# Patient Record
Sex: FEMALE | Race: WHITE
[De-identification: names, ages, dates, MRNs, and addresses within clinical notes are randomized per-mention and may not be internally consistent; named-entity substitution may affect disease eponyms.]

---

## 2018-02-07 ENCOUNTER — HOSPITAL ENCOUNTER (EMERGENCY)
Dept: HOSPITAL 89 - ER | Age: 28
Discharge: HOME | End: 2018-02-07
Payer: SELF-PAY

## 2018-02-07 VITALS — HEIGHT: 68 IN | WEIGHT: 238 LBS | BODY MASS INDEX: 36.07 KG/M2

## 2018-02-07 VITALS — SYSTOLIC BLOOD PRESSURE: 119 MMHG | DIASTOLIC BLOOD PRESSURE: 78 MMHG

## 2018-02-07 DIAGNOSIS — S93.602A: Primary | ICD-10-CM

## 2018-02-07 PROCEDURE — 99284 EMERGENCY DEPT VISIT MOD MDM: CPT

## 2018-02-07 NOTE — ER REPORT
History and Physical


Time Seen By MD:  17:20


Hx. of Stated Complaint:  


HURT LEFT FOOT ABOUT 2 WEEKS. GOTTEN WORSE THE LAST COUPLE DAYS. SWELLED A 


COUPLE DAYS AGO AND HARD TO WALK ON


HPI/ROS


CHIEF COMPLAINT: Left foot pain





HISTORY OF PRESENT ILLNESS: Patient is a 27-year-old female who presents the ED 

with complaint of left foot pain for the past 10 days. She states that she 

stepped on a rock about 10 days ago and twisted her left foot. She has had pain 

ever since. She has noted some swelling as well. She describes pain more in the 

mid foot area. She has not noted any bruising. She has not taken any medication 

or applied ice for this.





REVIEW OF SYSTEMS:


Respiratory: No cough, no dyspnea.


Cardiovascular: No chest pain, no palpitations.


Gastrointestinal: No vomiting, no abdominal pain.


Musculoskeletal: See history of present illness.


Allergies:  


Coded Allergies:  


     No Known Drug Allergies (Verified , 2/7/18)


Home Meds


Discontinued Scripts


Albuterol Sulfate (VENTOLIN HFA) 18 Gm Inh, 1-2 PUFF INH 3-4XD for cough, #1 

INH 0 Refills


   Prov:KIRSTEN CUNNINGHAM MD         10/2/17


Prednisone (PREDNISONE) 20 Mg Tablet, 60 MG PO QDAY, #12 TAB 0 Refills


   first dose on 10/3/17


   Prov:KIRSTEN CUNNINGHAM MD         10/2/17


Azithromycin (ZITHROMAX) 250 Mg Tablet, 2 TAB PO ONCE for 3 Days, #6 TAB 0 

Refills


   Prov:KIRSTEN CUNNINGHAM MD         10/2/17


Reviewed Nurses Notes:  Yes


Old Medical Records Reviewed:  Yes


Hx Smoking:  Yes (PPD)


Smoking Status:  Current: Every Day Smoker


Hx Substance Use Disorder:  No


Hx Alcohol Use:  No


Constitutional





Vital Sign - Last 24 Hours








 2/7/18





 17:27


 


Temp 97.9


 


Pulse 62


 


Resp 12


 


B/P (MAP) 119/78


 


Pulse Ox 97


 


O2 Delivery Room Air








Physical Exam


   General Appearance: The patient is alert, has no immediate need for airway 

protection and no signs of toxicity. Patient appears to be in no acute distress.


Respiratory: There are no retractions, lungs are clear to auscultation.


Cardiovascular: Regular rate and rhythm. 


Skin: Warm and dry, no rashes.


Musculoskeletal:  Neck is supple non tender.


There is pain with palpation of the left midfoot area. PT and DP pulses are 2+ 

with normal capillary refill. Normal sensation. Full range of motion with 

slight pain.





Medical Decision Making


EKG/Imaging


Imaging


Left Foot Xrays:





IMPRESSION:  Negative left foot.


 


Report Dictated By: Colton Hoffman MD at 2/7/2018 6:03 PM


 


Report E-Signed By: Colton Hoffman MD  at 2/7/2018 6:07 PM





ED Course/Re-evaluation


ED Course


Will obtain left foot x-rays.





 02/07/2018 6:17:15 pm - discussed left foot x-rays with patient. It appears 

that she has no fracture. She likely has a sprain of her left foot.


Decision to Disposition Date:  Feb 7, 2018


Decision to Disposition Time:  18:17





Depart


Departure


Latest Vital Signs





Vital Signs








  Date Time  Temp Pulse Resp B/P (MAP) Pulse Ox O2 Delivery O2 Flow Rate FiO2


 


2/7/18 17:27 97.9 62 12 119/78 97 Room Air  








Impression:  


 Primary Impression:  


 Sprain of left foot


Condition:  Improved


Disposition:  HOME OR SELF-CARE


Patient Instructions:  Foot Sprain (ED)





Additional Instructions:  


Rest, ice, elevate. May take Tylenol or ibuprofen for pain relief. Follow-up 

with primary care provider in 2-3 days.





Problem Qualifiers








 Primary Impression:  


 Sprain of left foot


 Encounter type:  initial encounter  Qualified Codes:  S93.602A - Unspecified 

sprain of left foot, initial encounter








ARIELLE GARCIA PA-C Feb 7, 2018 17:46

## 2018-02-07 NOTE — RADIOLOGY IMAGING REPORT
FACILITY: [facility] [place] 

 

PATIENT NAME: Katy Echeverria

: 1990

MR: [id number]

V: [id number]

EXAM DATE: 

ORDERING PHYSICIAN: ARIELLE GARCIA

TECHNOLOGIST: 

 

Location: [facility] [place]

Patient: Katy Echeverria

: 1990

MRN: [id number]

Visit/Account:[id number]

Date of Sevice:  2018

 

ACCESSION #: [id number]

 

EXAMINATION: Left foot 3 views

 

HISTORY: Left mid foot pain. Twisted 10 days ago.

 

COMPARISON: None.

 

FINDINGS:

No evidence of acute fracture or dislocation about the left foot.  Normal alignment.  Joint spaces ar
e preserved.  Soft tissues are unremarkable.

 

IMPRESSION:  Negative left foot.

 

Report Dictated By: Colton Hoffman MD at 2018 6:03 PM

 

Report E-Signed By: Colton Hoffman MD  at 2018 6:07 PM

 

WSN:[id number]

## 2018-11-10 ENCOUNTER — HOSPITAL ENCOUNTER (EMERGENCY)
Dept: HOSPITAL 89 - ER | Age: 28
Discharge: HOME | End: 2018-11-10
Payer: SELF-PAY

## 2018-11-10 VITALS — SYSTOLIC BLOOD PRESSURE: 112 MMHG | DIASTOLIC BLOOD PRESSURE: 87 MMHG

## 2018-11-10 DIAGNOSIS — M25.571: Primary | ICD-10-CM

## 2018-11-10 LAB — PLATELET COUNT, AUTOMATED: 307 K/UL (ref 150–450)

## 2018-11-10 PROCEDURE — 84450 TRANSFERASE (AST) (SGOT): CPT

## 2018-11-10 PROCEDURE — 82374 ASSAY BLOOD CARBON DIOXIDE: CPT

## 2018-11-10 PROCEDURE — 84550 ASSAY OF BLOOD/URIC ACID: CPT

## 2018-11-10 PROCEDURE — 84460 ALANINE AMINO (ALT) (SGPT): CPT

## 2018-11-10 PROCEDURE — 84075 ASSAY ALKALINE PHOSPHATASE: CPT

## 2018-11-10 PROCEDURE — 85651 RBC SED RATE NONAUTOMATED: CPT

## 2018-11-10 PROCEDURE — 36415 COLL VENOUS BLD VENIPUNCTURE: CPT

## 2018-11-10 PROCEDURE — 82565 ASSAY OF CREATININE: CPT

## 2018-11-10 PROCEDURE — 84132 ASSAY OF SERUM POTASSIUM: CPT

## 2018-11-10 PROCEDURE — 85025 COMPLETE CBC W/AUTO DIFF WBC: CPT

## 2018-11-10 PROCEDURE — 84520 ASSAY OF UREA NITROGEN: CPT

## 2018-11-10 PROCEDURE — 82947 ASSAY GLUCOSE BLOOD QUANT: CPT

## 2018-11-10 PROCEDURE — 82310 ASSAY OF CALCIUM: CPT

## 2018-11-10 PROCEDURE — 84155 ASSAY OF PROTEIN SERUM: CPT

## 2018-11-10 PROCEDURE — 82435 ASSAY OF BLOOD CHLORIDE: CPT

## 2018-11-10 PROCEDURE — 82040 ASSAY OF SERUM ALBUMIN: CPT

## 2018-11-10 PROCEDURE — 84295 ASSAY OF SERUM SODIUM: CPT

## 2018-11-10 PROCEDURE — 86140 C-REACTIVE PROTEIN: CPT

## 2018-11-10 PROCEDURE — 82247 BILIRUBIN TOTAL: CPT

## 2018-11-10 PROCEDURE — 99283 EMERGENCY DEPT VISIT LOW MDM: CPT

## 2018-11-10 NOTE — ER REPORT
History and Physical


Time Seen By MD:  21:05


Hx. of Stated Complaint:  


patient states that she has right ankle pain that started when she woke up today





from sleeping; patient states pain is 10/10 while standing


HPI/ROS


CHIEF COMPLAINT: Ankle pain





HISTORY OF PRESENT ILLNESS: This is a 28-year-old female. She awoke this evening


to go to work at her night shift at the local [employer]. She woke a few hours 


ago and noticed that she was having some right ankle pain. She took some 


ibuprofen because she does have a history of diffuse arthritis pain. She does 


not remember injuring the ankle at all. Because she does get pain occasionally 


she took the ibuprofen and went back to sleep to see if it would go away which 


pain like this usually does. When she woke to get up to go to work, she has 


severe pain in the ankle whenever she puts weight on it. She denies any fevers 


or chills. Even light touch seems to hurt the ankle on the medial aspect over 


the medial malleolus area. No history of inflammatory conditions that she is 


aware of such as gout or other specific arthritis's. Denies any shortness of 


breath or chest pain. No swelling of the ankle or leg.


Allergies:  


Coded Allergies:  


     No Known Drug Allergies (Verified , 2/7/18)


Home Meds


Active Scripts


Ketorolac Tromethamine (KETOROLAC TROMETHAMINE) 10 Mg Tab, 10 MG PO Q6H PRN for 


PAIN, #12 TAB 0 Refills


   Prov:YOVANI TITUS MD         11/10/18


Hydrocodone Bit/Acetaminophen (HYDROCODON-ACETAMINOPHEN 5-325) 1 Each Tablet, 1 


EACH PO Q4H PRN for PAIN, #6 TAB 0 Refills


   Prov:YOVANI TITUS MD         11/10/18


Reviewed Nurses Notes:  Yes


Hx Smoking:  Yes (PPD)


Smoking Status:  Current: Every Day Smoker


Hx Substance Use Disorder:  No


Hx Alcohol Use:  No


Constitutional





Vital Sign - Last 24 Hours








 11/10/18 11/10/18 11/10/18 11/10/18





 20:53 20:56 20:56 21:00


 


Temp   98.7 


 


Pulse ???  97 


 


Resp   18 


 


B/P (MAP)  132/92 (105) 132/92 108/64 (79)


 


Pulse Ox   96 


 


O2 Delivery   Room Air 


 


    





 11/10/18 11/10/18 11/10/18 11/10/18





 21:23 21:53 22:23 22:53


 


Pulse 78 73 76 78


 


B/P (MAP)    112/87 (95)


 


Pulse Ox 94 95 95 95








Physical Exam


  General Appearance: Alert, no acute distress.


Respiratory: Breathing easily, no shortness of breath.


Cardiac: Normal pulses in the ankle and foot.


Neuro: Normal sensation throughout the foot and lower leg. Active range of 


motion intact, no weakness.


Musculoskeletal: No tenderness over the foot itself. No pain in the Achilles 


tendon or calcaneus. She does have pain over the medial malleolus with pressure 


as well as light pressure. No tenderness over the lateral malleolus. No pain up 


in the lower leg. 


Skin: No rashes or lesions. The skin is not warm or swollen.





DIFFERENTIAL DIAGNOSIS: After history and physical exam differential diagnosis 


was considered for pain in the right ankle, uncertain etiology.





Medical Decision Making


Data Points


Result Diagram:  


11/10/18 2124                                                                   


            11/10/18 2124





Laboratory





Hematology








Test


 11/10/18


21:24


 


Red Blood Count


 5.46 M/uL


(4.17-5.56)


 


Mean Corpuscular Volume


 87.2 fL


(80.0-96.0)


 


Mean Corpuscular Hemoglobin


 29.5 pg


(26.0-33.0)


 


Mean Corpuscular Hemoglobin


Concent 33.8 g/dL


(32.0-36.0)


 


Red Cell Distribution Width


 13.7 %


(11.5-14.5)


 


Mean Platelet Volume


 7.8 fL


(7.2-11.1)


 


Neutrophils (%) (Auto)


 63.8 %


(39.4-72.5)


 


Lymphocytes (%) (Auto)


 25.3 %


(17.6-49.6)


 


Monocytes (%) (Auto)


 6.6 %


(4.1-12.4)


 


Eosinophils (%) (Auto)


 3.5 %


(0.4-6.7)


 


Basophils (%) (Auto)


 0.8 %


(0.3-1.4)


 


Nucleated RBC Relative Count


(auto) 0.0 /100WBC 





 


Neutrophils # (Auto)


 7.0 K/uL


(2.0-7.4)


 


Lymphocytes # (Auto)


 2.8 K/uL


(1.3-3.6)


 


Monocytes # (Auto)


 0.7 K/uL


(0.3-1.0)


 


Eosinophils # (Auto)


 0.4 K/uL


(0.0-0.5)


 


Basophils # (Auto)


 0.1 K/uL


(0.0-0.1)


 


Nucleated RBC Absolute Count


(auto) 0.00 K/uL 





 


Erythrocyte Sedimentation Rate


 16 mm/HOUR


(0-20)


 


Sodium Level


 138 mmol/L


(137-145)


 


Potassium Level


 4.0 mmol/L


(3.5-5.0)


 


Chloride Level


 106 mmol/L


()


 


Carbon Dioxide Level


 22 mmol/L


(22-31)


 


Blood Urea Nitrogen


 20 mg/dl


(7-18)


 


Creatinine


 0.70 mg/dl


(0.52-1.04)


 


Glomerular Filtration Rate


Calc > 60.0 





 


Random Glucose


 101 mg/dl


()


 


Uric Acid


 5.4 mg/dl


(2.5-7.5)


 


Calcium Level


 9.0 mg/dl


(8.4-10.2)


 


Total Bilirubin


 0.6 mg/dl


(0.2-1.3)


 


Aspartate Amino Transf


(AST/SGOT) 26 U/L (0-35) 





 


Alanine Aminotransferase


(ALT/SGPT) 42 U/L (0-56) 





 


Alkaline Phosphatase 64 U/L (0-126) 


 


C-Reactive Protein


 0.6 mg/dl


(<1.0)


 


Total Protein


 7.3 g/dl


(6.3-8.2)


 


Albumin


 3.9 g/dl


(3.5-5.0)








Chemistry








Test


 11/10/18


21:24


 


White Blood Count


 10.9 k/uL


(4.5-11.0)


 


Red Blood Count


 5.46 M/uL


(4.17-5.56)


 


Hemoglobin


 16.1 g/dL


(12.0-16.0)


 


Hematocrit


 47.7 %


(34.0-47.0)


 


Mean Corpuscular Volume


 87.2 fL


(80.0-96.0)


 


Mean Corpuscular Hemoglobin


 29.5 pg


(26.0-33.0)


 


Mean Corpuscular Hemoglobin


Concent 33.8 g/dL


(32.0-36.0)


 


Red Cell Distribution Width


 13.7 %


(11.5-14.5)


 


Platelet Count


 307 K/uL


(150-450)


 


Mean Platelet Volume


 7.8 fL


(7.2-11.1)


 


Neutrophils (%) (Auto)


 63.8 %


(39.4-72.5)


 


Lymphocytes (%) (Auto)


 25.3 %


(17.6-49.6)


 


Monocytes (%) (Auto)


 6.6 %


(4.1-12.4)


 


Eosinophils (%) (Auto)


 3.5 %


(0.4-6.7)


 


Basophils (%) (Auto)


 0.8 %


(0.3-1.4)


 


Nucleated RBC Relative Count


(auto) 0.0 /100WBC 





 


Neutrophils # (Auto)


 7.0 K/uL


(2.0-7.4)


 


Lymphocytes # (Auto)


 2.8 K/uL


(1.3-3.6)


 


Monocytes # (Auto)


 0.7 K/uL


(0.3-1.0)


 


Eosinophils # (Auto)


 0.4 K/uL


(0.0-0.5)


 


Basophils # (Auto)


 0.1 K/uL


(0.0-0.1)


 


Nucleated RBC Absolute Count


(auto) 0.00 K/uL 





 


Erythrocyte Sedimentation Rate


 16 mm/HOUR


(0-20)


 


Glomerular Filtration Rate


Calc > 60.0 





 


Uric Acid


 5.4 mg/dl


(2.5-7.5)


 


Calcium Level


 9.0 mg/dl


(8.4-10.2)


 


Total Bilirubin


 0.6 mg/dl


(0.2-1.3)


 


Aspartate Amino Transf


(AST/SGOT) 26 U/L (0-35) 





 


Alanine Aminotransferase


(ALT/SGPT) 42 U/L (0-56) 





 


Alkaline Phosphatase 64 U/L (0-126) 


 


C-Reactive Protein


 0.6 mg/dl


(<1.0)


 


Total Protein


 7.3 g/dl


(6.3-8.2)


 


Albumin


 3.9 g/dl


(3.5-5.0)











EKG/Imaging


Imaging


INDICATION: ankle pain


 


EXAM DATE:  11/10/2018 9:11 PM


 


COMPARISON: None.


 


FINDINGS:


3 views right ankle. Mineralization is normal. No acute alignment abnormality or


fracture.  Small Achilles enthesophyte.  Soft tissues are unremarkable.


 


IMPRESSION:  No acute osseous abnormality of right ankle.


 


Report Dictated By: Rico Peterson MD at 11/10/2018 10:30 PM





ED Course/Re-evaluation


ED Course


Imaging negative for acute abnormality. Labs also negative. No sign of in


fection. Likely inflammatory or unknown injury. Discussed treatment options with


the patient. We will provide Toradol and she can use Tylenol as well. If still 


too painful, recommended offloading and rest with elevation, ice. Also gave a 


note to be off work tonight. Take home pack of Lortab 5/325, 2 tablets given and


a prescription for #6.


Decision to Disposition Date:  Nov 10, 2018


Decision to Disposition Time:  22:44





Depart


Departure


Latest Vital Signs





Vital Signs








  Date Time  Temp Pulse Resp B/P (MAP) Pulse Ox O2 Delivery O2 Flow Rate FiO2


 


11/10/18 22:53  78  112/87 (95) 95   


 


11/10/18 20:56 98.7  18   Room Air  








Impression:  


   Primary Impression:  


   Ankle pain, right


Condition:  Improved


Disposition:  HOME OR SELF-CARE


New Scripts


Ketorolac Tromethamine (KETOROLAC TROMETHAMINE) 10 Mg Tab


10 MG PO Q6H PRN for PAIN, #12 TAB 0 Refills


   Prov: YOVANI TITUS MD         11/10/18 


Hydrocodone Bit/Acetaminophen (HYDROCODON-ACETAMINOPHEN 5-325) 1 Each Tablet


1 EACH PO Q4H PRN for PAIN, #6 TAB 0 Refills


   Prov: YOVANI TITUS MD         11/10/18


Patient Instructions:  Musculoskeletal Pain (ED)





Additional Instructions:  


We did not see a definitive cause to explain the pain in your ankle.


We suspect an inflammatory cause.


Take Toradol 10mg tablets, one every 6 hours for pain.


Take Tylenol 500mg tablets, 2 tablets every 6 hours as needed for pain.


If this is not sufficient then you will need to be off work.


You can take Lortab in place of the Tylenol.


Keep the foot elevated at rest and apply ice every hour as possible for about 


10-15 minutes.


If not improving, you will need to follow-up with your primary care provider or 


with orthopedic surgery for re-evaluation.





Problem Qualifiers








   Primary Impression:  


   Ankle pain, right


   Chronicity:  acute  Qualified Codes:  M25.571 - Pain in right ankle and 


   joints of right foot








YOVANI TITUS MD             Nov 10, 2018 21:06

## 2019-03-22 ENCOUNTER — HOSPITAL ENCOUNTER (EMERGENCY)
Dept: HOSPITAL 89 - ER | Age: 29
Discharge: HOME | End: 2019-03-22
Payer: SELF-PAY

## 2019-03-22 VITALS — SYSTOLIC BLOOD PRESSURE: 112 MMHG | DIASTOLIC BLOOD PRESSURE: 75 MMHG

## 2019-03-22 DIAGNOSIS — S63.501A: Primary | ICD-10-CM

## 2019-03-22 PROCEDURE — 99283 EMERGENCY DEPT VISIT LOW MDM: CPT

## 2019-03-22 NOTE — RADIOLOGY IMAGING REPORT
FACILITY: [facility] 

 

PATIENT NAME: Katy Echeverria

: 1990

MR: [id number]

V: [id number]

EXAM DATE: 

ORDERING PHYSICIAN: KIRSTEN CUNNINGHAM

TECHNOLOGIST: 

 

Location: [facility]

Patient: Katy Echeverria

: 1990

MRN: [id number]

Visit/Account:[id number]

Date of Sevice:  3/22/2019

 

ACCESSION #: [id number]

 

XR WRIST 3 OR MORE VIEWS RT

 

HISTORY: trauma

 

COMPARISON: None

 

FINDINGS: No evidence of acute fracture or dislocation. Carpal rows are well aligned. Scaphoid bone i
s intact. Scapholunate and lunotriquetral intervals are normal.

 

IMPRESSION:

No evidence of acute osseous injury.

 

 

Report Dictated By: Cecilio Duval at 3/22/2019 9:16 AM

 

Report E-Signed By: Cecilio Duval  at 3/22/2019 9:16 AM

 

WSN:[id number]

## 2019-03-22 NOTE — ER REPORT
History and Physical


Time Seen By MD:  08:38


HPI/ROS


CHIEF COMPLAINT: Wrist injury





HISTORY OF PRESENT ILLNESS: Patient is a 28-year-old female presents emergency 


department with complaint of right wrist pain. She states that approximately 


week ago she punched a wall and then a few days later slammed the back of her 


hand against a desk. She also states that she didn't carrying heavy boxes of 


wood at the wrist and now is having pain that is along the radial aspect of the 


wrist and extends approximately one third of the forearm. He also reports prior 


wrist injury in the past. Patient is right-hand dominant.


Allergies:  


Coded Allergies:  


     No Known Drug Allergies (Verified , 3/22/19)


Home Meds


Active Scripts


Naproxen (NAPROXEN) 375 Mg Tablet.dr, 375 MG PO TID for PAIN, #20 TAB 0 Refills


   Prov:KIRSTEN CUNNINGHAM MD         3/22/19


Reported Medications


Ibuprofen (IBUPROFEN) 200 Mg Capsule, 5 CAP PO BID PRN for MIGRAINE, CAPSULE


   3/22/19


Discontinued Scripts


Cephalexin 500 Mg Tab (KEFLEX 500 MG TAB) 500 Mg Tablet, 500 MG PO Q6H, #28 TAB


   Prov:ELAINE VALLES         12/21/18


Past Medical/Surgical History


migraines


Hx Smoking:  Yes (PPD)


Smoking Status:  Current: Every Day Smoker


Hx Substance Use Disorder:  No


Hx Alcohol Use:  No


Constitutional





Vital Sign - Last 24 Hours








 3/22/19 3/22/19 3/22/19 3/22/19





 08:39 08:45 09:00 09:30


 


Temp 98.1   


 


Pulse 75 77 70 66


 


Resp 16   


 


B/P (MAP) 129/89  108/70 (83) 112/75 (87)


 


Pulse Ox 95 93  96


 


O2 Delivery Room Air   








Physical Exam


Examination of the Right hand reveals no acute deformity. The patient is able to


give a thumbs up sign, is able to make an okay sign, and is able to AB duct the 


fingers. Sensation is intact over the dorsal 1st web space, the volar aspect of 


the 2nd finger, and the volar aspect of the 5th finger. Capillary refill is 


brisk. + Finklestein's test. No snuff box tenderness.





Medical Decision Making


EKG/Imaging


Imaging


XRAY of right wrist negative for dislocation or acute bony injury.





ED Course/Re-evaluation


ED Course


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


Decision to Disposition Date:  Mar 22, 2019


Decision to Disposition Time:  09:25





Depart


Departure


Latest Vital Signs





Vital Signs








  Date Time  Temp Pulse Resp B/P (MAP) Pulse Ox O2 Delivery O2 Flow Rate FiO2


 


3/22/19 09:30  66  112/75 (87) 96   


 


3/22/19 08:39 98.1  16   Room Air  








Impression:  


   Primary Impression:  


   Right wrist sprain


Condition:  Improved


Disposition:  HOME OR SELF-CARE


New Scripts


Naproxen (NAPROXEN) 375 Mg Tablet.


375 MG PO TID for PAIN, #20 TAB 0 Refills


   Prov: KIRSTEN CUNNINGHAM MD         3/22/19


Patient Instructions:  Wrist Sprain (ED)





Additional Instructions:  


Take Naprosyn as directed for your pain. Do not take any additional nonsteroidal


anti-inflammatories, this would include aspirin, Motrin, Advil, Aleve while 


you're taking the Naprosyn. It is okay to take additional Tylenol.





Where your wrist splint for the next 7-10 days





Problem Qualifiers








   Primary Impression:  


   Right wrist sprain


   Encounter type:  initial encounter  Qualified Codes:  S63.501A - Unspecified 


   sprain of right wrist, initial encounter








KIRSTEN CUNNINGHAM MD             Mar 22, 2019 08:38